# Patient Record
Sex: FEMALE | Race: WHITE | NOT HISPANIC OR LATINO | Employment: OTHER | URBAN - METROPOLITAN AREA
[De-identification: names, ages, dates, MRNs, and addresses within clinical notes are randomized per-mention and may not be internally consistent; named-entity substitution may affect disease eponyms.]

---

## 2020-02-27 ENCOUNTER — CONSULTATION (OUTPATIENT)
Dept: URBAN - METROPOLITAN AREA CLINIC 11 | Facility: CLINIC | Age: 70
End: 2020-02-27

## 2020-02-27 ASSESSMENT — VISUAL ACUITY
OS_PH: 20/30
OD_SC: 20/20
OS_SC: 20/40

## 2020-02-27 NOTE — PATIENT DISCUSSION
Over 50% of exam was spent in dialogue with patient discussing membranes and strands.  Patient is symptomatic.  Offered PPV in the right eye. Discussed procedure, risks and benefits with patient.  She desires to proceed with PPV in the right eye and then plan the left eye approximately a month later. All questions answered. Subjective


No acute events overnight.  Breast-feeding fair mom report patient was not 

interested in feeding overnight.  This morning patient got circumcised


Urine 6.  stool x5 


TCB at 24 hours 4.0





Objective





- Vital Signs


Vital signs: 


                                   Vital Signs











Temp  97.9 F   10/26/19 07:55


 


Pulse  130   10/26/19 07:55


 


Resp  36   10/26/19 07:55


 


BP      


 


Pulse Ox      








                                 Intake & Output











 10/25/19 10/26/19 10/26/19





 18:59 06:59 18:59


 


Intake Total 33  


 


Balance 33  


 


Weight 3.42 kg 3.315 kg 


 


Intake:   


 


  Oral 33  


 


    Feeding Type 1 33  


 


Other:   


 


  Intake, Breast Feeding   





  Duration (minutes)   


 


    Feeding Type 1 0 20 15


 


  # Voids 0 1 


 


  # Bowel Movements 1 1 














- Exam


General: Alert, strong cry, no gross facial dysmorphism


HEENT: Anterior fontanelle soft and flat. Ears appear normal bilateral. Nose is 

normal.  ankyloglossia


Mouth: Hard palate fused. Normal mucosa


Chest: Symmetrical movements.


Heart: S1 S2 heard, no murmurs. Femoral pulses palpable bilaterally.


Respiratory: Lungs clear to auscultation bilateral, respirations unlabored


Abdomen: Soft, non tender, no organomegaly. Bowel sounds normal. Umbilical cord 

looks intact


Skin: No rash/lesions








Assessment and Plan


(1) Single liveborn, born in hospital, delivered by  delivery


Current Visit: Yes   Status: Acute   Code(s): Z38.01 - SINGLE LIVEBORN INFANT, 

DELIVERED BY    SNOMED Code(s): 878373456


   





(2) Webbed toes of both feet


Current Visit: Yes   Status: Acute   Code(s): Q70.33 - WEBBED TOES, BILATERAL   

SNOMED Code(s): 726187744009188


   


Plan: 


Routine  care


Closely monitor  feeds

## 2022-05-05 ENCOUNTER — ESTABLISHED PATIENT (OUTPATIENT)
Dept: URBAN - METROPOLITAN AREA CLINIC 11 | Facility: CLINIC | Age: 72
End: 2022-05-05

## 2022-05-05 DIAGNOSIS — H43.313: ICD-10-CM

## 2022-05-05 DIAGNOSIS — H35.373: ICD-10-CM

## 2022-05-05 DIAGNOSIS — H43.813: ICD-10-CM

## 2022-05-05 PROCEDURE — 99214 OFFICE O/P EST MOD 30 MIN: CPT

## 2022-05-05 PROCEDURE — 92134 CPTRZ OPH DX IMG PST SGM RTA: CPT

## 2022-05-05 PROCEDURE — 92201 OPSCPY EXTND RTA DRAW UNI/BI: CPT

## 2022-05-05 ASSESSMENT — TONOMETRY
OS_IOP_MMHG: 23
OD_IOP_MMHG: 23

## 2022-05-05 ASSESSMENT — VISUAL ACUITY
OS_SC: 20/70
OS_PH: 20/20
OD_SC: 20/40+2

## 2022-05-05 NOTE — PATIENT DISCUSSION
Over 50% of exam was spent in dialogue with patient discussing membranes and strands.  Patient is symptomatic.  Offered PPV in the both eyes. Discussed procedure, risks and benefits with patient.  She desires to proceed with PPV in the right eye and then plan the left eye approximately a month later. All questions answered.

## 2022-06-01 ENCOUNTER — SURGERY/PROCEDURE (OUTPATIENT)
Dept: URBAN - METROPOLITAN AREA EYE CENTER 1 | Facility: EYE CENTER | Age: 72
End: 2022-06-01

## 2022-06-01 DIAGNOSIS — H43.313: ICD-10-CM

## 2022-06-01 PROCEDURE — 67036 REMOVAL OF INNER EYE FLUID: CPT

## 2022-06-02 ENCOUNTER — POST-OP (OUTPATIENT)
Dept: URBAN - METROPOLITAN AREA CLINIC 11 | Facility: CLINIC | Age: 72
End: 2022-06-02

## 2022-06-02 DIAGNOSIS — H43.313: ICD-10-CM

## 2022-06-02 PROCEDURE — 99024 POSTOP FOLLOW-UP VISIT: CPT

## 2022-06-02 ASSESSMENT — TONOMETRY
OD_IOP_MMHG: 6
OS_IOP_MMHG: 19

## 2022-06-02 ASSESSMENT — VISUAL ACUITY
OS_SC: 20/70+2
OS_PH: 20/25+1
OD_SC: 20/25+2

## 2022-06-02 NOTE — PATIENT DISCUSSION
Good one day post op appearance. All instructions concerning eye drops and post care has been communicated and understood.  I will see her in one week for her next post op exam.

## 2022-06-02 NOTE — PATIENT DISCUSSION
Good one day po appearance OD.  Retina is attached. Pt with acute bacterial pneumonia  Waiting days is not possible    Call pt and discussed changing antibiotic  He reports he is better on this and will pay out of pocket for azithro

## 2022-06-09 ENCOUNTER — POST-OP (OUTPATIENT)
Dept: URBAN - METROPOLITAN AREA CLINIC 11 | Facility: CLINIC | Age: 72
End: 2022-06-09

## 2022-06-09 DIAGNOSIS — H43.313: ICD-10-CM

## 2022-06-09 PROCEDURE — 99024 POSTOP FOLLOW-UP VISIT: CPT

## 2022-06-09 ASSESSMENT — VISUAL ACUITY
OS_SC: 20/40
OD_SC: 20/20

## 2022-06-09 ASSESSMENT — TONOMETRY
OD_IOP_MMHG: 17
OS_IOP_MMHG: 18

## 2022-06-09 NOTE — PATIENT DISCUSSION
Good one week post op appearance for Mrs. Maravilla Heart. All instructions concerning further post op care and changes to eye drops has been communicated and understood. She has agreed to proceed with a PPV for the left eye after reviewing the procedure with the risks and benefits discussed.  I will have my  reach out to her to set up a date and time for the procedure.

## 2022-07-13 ENCOUNTER — SURGERY/PROCEDURE (OUTPATIENT)
Dept: URBAN - METROPOLITAN AREA EYE CENTER 1 | Facility: EYE CENTER | Age: 72
End: 2022-07-13

## 2022-07-13 DIAGNOSIS — H43.313: ICD-10-CM

## 2022-07-13 PROCEDURE — 67036 REMOVAL OF INNER EYE FLUID: CPT

## 2022-07-13 NOTE — PATIENT DISCUSSION
Good one week post op appearance for Mrs. Julaine Galeazzi. All instructions concerning further post op care and changes to eye drops has been communicated and understood. She has agreed to proceed with a PPV for the left eye after reviewing the procedure with the risks and benefits discussed.  I will have my  reach out to her to set up a date and time for the procedure.

## 2022-07-14 ENCOUNTER — POST-OP (OUTPATIENT)
Dept: URBAN - METROPOLITAN AREA CLINIC 11 | Facility: CLINIC | Age: 72
End: 2022-07-14

## 2022-07-14 DIAGNOSIS — H43.313: ICD-10-CM

## 2022-07-14 PROCEDURE — 99024 POSTOP FOLLOW-UP VISIT: CPT

## 2022-07-14 ASSESSMENT — TONOMETRY: OS_IOP_MMHG: 11

## 2022-07-14 ASSESSMENT — VISUAL ACUITY
OS_SC: 20/20
OU_SC: 20/20

## 2022-07-14 NOTE — PATIENT DISCUSSION
All instructions concerning post op care and eye drop regimen have been communicated and understood by  Charito Sanchez.  I will see her back in a week for her next post op exam.

## 2022-07-21 ENCOUNTER — POST-OP (OUTPATIENT)
Dept: URBAN - METROPOLITAN AREA CLINIC 11 | Facility: CLINIC | Age: 72
End: 2022-07-21

## 2022-07-21 DIAGNOSIS — H43.313: ICD-10-CM

## 2022-07-21 PROCEDURE — 99024 POSTOP FOLLOW-UP VISIT: CPT

## 2022-07-21 ASSESSMENT — VISUAL ACUITY
OD_SC: 20/20-1
OS_SC: 20/40+2

## 2022-07-21 ASSESSMENT — TONOMETRY
OD_IOP_MMHG: 14
OS_IOP_MMHG: 17

## 2022-07-21 NOTE — PATIENT DISCUSSION
Good one week post op. Patient understands that she will use the Prednisolone QDAY for a week and discontinue the Ofloxacin drops. I'm releasing her back into your care and I will see her as needed.

## 2023-04-24 ENCOUNTER — COMPREHENSIVE EXAM (OUTPATIENT)
Facility: LOCATION | Age: 73
End: 2023-04-24

## 2023-04-24 DIAGNOSIS — H35.363: ICD-10-CM

## 2023-04-24 PROCEDURE — 92014 COMPRE OPH EXAM EST PT 1/>: CPT

## 2023-04-24 ASSESSMENT — KERATOMETRY
OS_AXISANGLE2_DEGREES: 94
OD_K1POWER_DIOPTERS: 45.25
OD_AXISANGLE_DEGREES: 25
OS_AXISANGLE_DEGREES: 4
OS_K1POWER_DIOPTERS: 45.00
OD_K2POWER_DIOPTERS: 45.75
OD_AXISANGLE2_DEGREES: 115
OS_K2POWER_DIOPTERS: 45.50

## 2023-04-24 ASSESSMENT — VISUAL ACUITY
OS_SC: 20/25
OS_SC: J3
OD_SC: 20/20-2
OD_SC: J5

## 2023-04-24 ASSESSMENT — TONOMETRY
OD_IOP_MMHG: 17
OS_IOP_MMHG: 18

## 2024-04-25 ENCOUNTER — ESTABLISHED PATIENT (OUTPATIENT)
Facility: LOCATION | Age: 74
End: 2024-04-25

## 2024-04-25 DIAGNOSIS — H35.363: ICD-10-CM

## 2024-04-25 DIAGNOSIS — H52.4: ICD-10-CM

## 2024-04-25 PROCEDURE — 92014 COMPRE OPH EXAM EST PT 1/>: CPT

## 2024-04-25 PROCEDURE — 92015 DETERMINE REFRACTIVE STATE: CPT

## 2024-04-25 PROCEDURE — 92134 CPTRZ OPH DX IMG PST SGM RTA: CPT

## 2024-04-25 ASSESSMENT — TONOMETRY
OS_IOP_MMHG: 20
OD_IOP_MMHG: 19

## 2024-04-25 ASSESSMENT — VISUAL ACUITY
OS_CC: 20/20-1
OD_CC: 20/30-1

## 2024-04-25 ASSESSMENT — KERATOMETRY
OS_AXISANGLE_DEGREES: 176
OS_K1POWER_DIOPTERS: 45.25
OS_K2POWER_DIOPTERS: 46.25
OD_K2POWER_DIOPTERS: 45.75
OD_AXISANGLE2_DEGREES: 105
OS_AXISANGLE2_DEGREES: 86
OD_AXISANGLE_DEGREES: 15
OD_K1POWER_DIOPTERS: 45.25

## 2025-04-30 ENCOUNTER — COMPREHENSIVE EXAM (OUTPATIENT)
Age: 75
End: 2025-04-30

## 2025-04-30 DIAGNOSIS — H35.363: ICD-10-CM

## 2025-04-30 PROCEDURE — 92014 COMPRE OPH EXAM EST PT 1/>: CPT

## 2025-04-30 PROCEDURE — 92134 CPTRZ OPH DX IMG PST SGM RTA: CPT
